# Patient Record
Sex: FEMALE | Race: WHITE | URBAN - METROPOLITAN AREA
[De-identification: names, ages, dates, MRNs, and addresses within clinical notes are randomized per-mention and may not be internally consistent; named-entity substitution may affect disease eponyms.]

---

## 2017-10-19 ENCOUNTER — IMPORTED ENCOUNTER (OUTPATIENT)
Dept: URBAN - METROPOLITAN AREA CLINIC 50 | Facility: CLINIC | Age: 67
End: 2017-10-19

## 2017-10-20 ENCOUNTER — IMPORTED ENCOUNTER (OUTPATIENT)
Dept: URBAN - METROPOLITAN AREA CLINIC 50 | Facility: CLINIC | Age: 67
End: 2017-10-20

## 2017-12-01 ENCOUNTER — IMPORTED ENCOUNTER (OUTPATIENT)
Dept: URBAN - METROPOLITAN AREA CLINIC 50 | Facility: CLINIC | Age: 67
End: 2017-12-01

## 2017-12-01 NOTE — PATIENT DISCUSSION
"""Continue Artificial tears both eyes two - four times a day. "" ""Continue Restasis both eyes twice a day.  """

## 2018-04-09 ENCOUNTER — IMPORTED ENCOUNTER (OUTPATIENT)
Dept: URBAN - METROPOLITAN AREA CLINIC 50 | Facility: CLINIC | Age: 68
End: 2018-04-09

## 2018-04-23 ENCOUNTER — IMPORTED ENCOUNTER (OUTPATIENT)
Dept: URBAN - METROPOLITAN AREA CLINIC 50 | Facility: CLINIC | Age: 68
End: 2018-04-23

## 2018-05-07 ENCOUNTER — IMPORTED ENCOUNTER (OUTPATIENT)
Dept: URBAN - METROPOLITAN AREA CLINIC 50 | Facility: CLINIC | Age: 68
End: 2018-05-07

## 2018-06-25 ENCOUNTER — IMPORTED ENCOUNTER (OUTPATIENT)
Dept: URBAN - METROPOLITAN AREA CLINIC 50 | Facility: CLINIC | Age: 68
End: 2018-06-25

## 2018-12-31 ENCOUNTER — IMPORTED ENCOUNTER (OUTPATIENT)
Dept: URBAN - METROPOLITAN AREA CLINIC 50 | Facility: CLINIC | Age: 68
End: 2018-12-31

## 2018-12-31 NOTE — PATIENT DISCUSSION
"""Patient is symptomatic for irritation and itching.  Would like a consult with Dr. Kamryn Manzanares for ""

## 2019-04-23 ENCOUNTER — IMPORTED ENCOUNTER (OUTPATIENT)
Dept: URBAN - METROPOLITAN AREA CLINIC 50 | Facility: CLINIC | Age: 69
End: 2019-04-23

## 2020-04-01 ENCOUNTER — IMPORTED ENCOUNTER (OUTPATIENT)
Dept: URBAN - METROPOLITAN AREA CLINIC 50 | Facility: CLINIC | Age: 70
End: 2020-04-01

## 2021-05-14 ASSESSMENT — VISUAL ACUITY
OS_CC: J1+
OS_CC: J1+
OD_SC: 20/30
OD_CC: J1+
OD_SC: 20/25-
OD_SC: 20/30-
OS_SC: 20/30+
OS_SC: 20/25-
OD_SC: 20/20-1
OD_CC: J1+
OS_SC: 20/25-
OS_SC: 20/30+2
OS_SC: 20/25-3
OD_SC: 20/25-
OS_SC: 20/25-2
OD_SC: 20/30
OD_SC: 20/40+

## 2021-05-14 ASSESSMENT — TONOMETRY
OS_IOP_MMHG: 12
OS_IOP_MMHG: 19
OD_IOP_MMHG: 14
OS_IOP_MMHG: 18
OS_IOP_MMHG: 12
OD_IOP_MMHG: 20
OD_IOP_MMHG: 12
OD_IOP_MMHG: 12
OD_IOP_MMHG: 21
OS_IOP_MMHG: 17
OD_IOP_MMHG: 18
OD_IOP_MMHG: 18
OS_IOP_MMHG: 19
OD_IOP_MMHG: 18